# Patient Record
Sex: FEMALE | Race: WHITE | NOT HISPANIC OR LATINO | URBAN - METROPOLITAN AREA
[De-identification: names, ages, dates, MRNs, and addresses within clinical notes are randomized per-mention and may not be internally consistent; named-entity substitution may affect disease eponyms.]

---

## 2018-11-28 ENCOUNTER — TELEPHONE (OUTPATIENT)
Dept: HEMATOLOGY ONCOLOGY | Facility: MEDICAL CENTER | Age: 28
End: 2018-11-28

## 2018-11-28 NOTE — TELEPHONE ENCOUNTER
Confirmed with patients mother Deysi upcoming scheduled appointment for genetic testing on 12/20/2018 at 10:00 am. I let patient mother know that I will relay this message to both Dr. Champion and his medical assistant, Art Hill to make sure her Requisition form gets filled out for when patient comes in for blood draw. Deysi will call our office if there are any further questions or concerns.

## 2018-12-20 ENCOUNTER — APPOINTMENT (OUTPATIENT)
Dept: HEMATOLOGY ONCOLOGY | Facility: MEDICAL CENTER | Age: 28
End: 2018-12-20
Payer: COMMERCIAL

## 2018-12-20 ENCOUNTER — OFFICE VISIT (OUTPATIENT)
Dept: HEMATOLOGY ONCOLOGY | Facility: MEDICAL CENTER | Age: 28
End: 2018-12-20
Payer: COMMERCIAL

## 2018-12-20 DIAGNOSIS — C80.1 CANCER (HCC): ICD-10-CM
